# Patient Record
Sex: FEMALE | URBAN - METROPOLITAN AREA
[De-identification: names, ages, dates, MRNs, and addresses within clinical notes are randomized per-mention and may not be internally consistent; named-entity substitution may affect disease eponyms.]

---

## 2019-05-23 VITALS
HEIGHT: 65 IN | SYSTOLIC BLOOD PRESSURE: 110 MMHG | WEIGHT: 136.03 LBS | OXYGEN SATURATION: 99 % | DIASTOLIC BLOOD PRESSURE: 58 MMHG | TEMPERATURE: 98 F | HEART RATE: 61 BPM | RESPIRATION RATE: 16 BRPM

## 2019-05-23 NOTE — ASU PATIENT PROFILE, ADULT - PMH
Anemia    Endometriosis    Hypothyroidism    Insomnia    Postnasal drip Adenomyoma    Anemia    Endometriosis    Hypothyroidism    Insomnia    Postnasal drip

## 2019-05-24 ENCOUNTER — INPATIENT (INPATIENT)
Facility: HOSPITAL | Age: 34
LOS: 0 days | Discharge: HOME | DRG: 743 | End: 2019-05-25
Attending: OBSTETRICS & GYNECOLOGY | Admitting: OBSTETRICS & GYNECOLOGY
Payer: COMMERCIAL

## 2019-05-24 RX ORDER — KETOROLAC TROMETHAMINE 30 MG/ML
30 SYRINGE (ML) INJECTION EVERY 6 HOURS
Refills: 0 | Status: DISCONTINUED | OUTPATIENT
Start: 2019-05-24 | End: 2019-05-25

## 2019-05-24 RX ORDER — DOCUSATE SODIUM 100 MG
100 CAPSULE ORAL THREE TIMES A DAY
Refills: 0 | Status: DISCONTINUED | OUTPATIENT
Start: 2019-05-24 | End: 2019-05-25

## 2019-05-24 RX ORDER — ONDANSETRON 8 MG/1
4 TABLET, FILM COATED ORAL EVERY 6 HOURS
Refills: 0 | Status: DISCONTINUED | OUTPATIENT
Start: 2019-05-24 | End: 2019-05-25

## 2019-05-24 RX ORDER — ZOLPIDEM TARTRATE 10 MG/1
5 TABLET ORAL AT BEDTIME
Refills: 0 | Status: DISCONTINUED | OUTPATIENT
Start: 2019-05-24 | End: 2019-05-25

## 2019-05-24 RX ORDER — HYDROMORPHONE HYDROCHLORIDE 2 MG/ML
0.5 INJECTION INTRAMUSCULAR; INTRAVENOUS; SUBCUTANEOUS
Refills: 0 | Status: DISCONTINUED | OUTPATIENT
Start: 2019-05-24 | End: 2019-05-25

## 2019-05-24 RX ORDER — SIMETHICONE 80 MG/1
80 TABLET, CHEWABLE ORAL THREE TIMES A DAY
Refills: 0 | Status: DISCONTINUED | OUTPATIENT
Start: 2019-05-24 | End: 2019-05-25

## 2019-05-24 RX ORDER — SODIUM CHLORIDE 9 MG/ML
1000 INJECTION, SOLUTION INTRAVENOUS
Refills: 0 | Status: DISCONTINUED | OUTPATIENT
Start: 2019-05-24 | End: 2019-05-25

## 2019-05-24 RX ORDER — LIDOCAINE 4 G/100G
1 CREAM TOPICAL
Refills: 0 | Status: DISCONTINUED | OUTPATIENT
Start: 2019-05-24 | End: 2019-05-25

## 2019-05-24 RX ORDER — PHENAZOPYRIDINE HCL 100 MG
200 TABLET ORAL ONCE
Refills: 0 | Status: COMPLETED | OUTPATIENT
Start: 2019-05-24 | End: 2019-05-24

## 2019-05-24 RX ORDER — ACETAMINOPHEN 500 MG
650 TABLET ORAL EVERY 6 HOURS
Refills: 0 | Status: DISCONTINUED | OUTPATIENT
Start: 2019-05-24 | End: 2019-05-25

## 2019-05-24 RX ORDER — HYDROMORPHONE HYDROCHLORIDE 2 MG/ML
0.5 INJECTION INTRAMUSCULAR; INTRAVENOUS; SUBCUTANEOUS
Refills: 0 | Status: DISCONTINUED | OUTPATIENT
Start: 2019-05-24 | End: 2019-05-24

## 2019-05-24 RX ORDER — HYDROMORPHONE HYDROCHLORIDE 2 MG/ML
0.5 INJECTION INTRAMUSCULAR; INTRAVENOUS; SUBCUTANEOUS ONCE
Refills: 0 | Status: DISCONTINUED | OUTPATIENT
Start: 2019-05-24 | End: 2019-05-24

## 2019-05-24 RX ADMIN — HYDROMORPHONE HYDROCHLORIDE 0.5 MILLIGRAM(S): 2 INJECTION INTRAMUSCULAR; INTRAVENOUS; SUBCUTANEOUS at 10:41

## 2019-05-24 RX ADMIN — Medication 30 MILLIGRAM(S): at 22:30

## 2019-05-24 RX ADMIN — HYDROMORPHONE HYDROCHLORIDE 0.5 MILLIGRAM(S): 2 INJECTION INTRAMUSCULAR; INTRAVENOUS; SUBCUTANEOUS at 12:09

## 2019-05-24 RX ADMIN — Medication 100 MILLIGRAM(S): at 21:39

## 2019-05-24 RX ADMIN — Medication 30 MILLIGRAM(S): at 21:39

## 2019-05-24 RX ADMIN — SODIUM CHLORIDE 125 MILLILITER(S): 9 INJECTION, SOLUTION INTRAVENOUS at 10:42

## 2019-05-24 RX ADMIN — HYDROMORPHONE HYDROCHLORIDE 0.5 MILLIGRAM(S): 2 INJECTION INTRAMUSCULAR; INTRAVENOUS; SUBCUTANEOUS at 23:28

## 2019-05-24 RX ADMIN — SIMETHICONE 80 MILLIGRAM(S): 80 TABLET, CHEWABLE ORAL at 14:14

## 2019-05-24 RX ADMIN — HYDROMORPHONE HYDROCHLORIDE 0.5 MILLIGRAM(S): 2 INJECTION INTRAMUSCULAR; INTRAVENOUS; SUBCUTANEOUS at 13:45

## 2019-05-24 RX ADMIN — HYDROMORPHONE HYDROCHLORIDE 0.5 MILLIGRAM(S): 2 INJECTION INTRAMUSCULAR; INTRAVENOUS; SUBCUTANEOUS at 12:19

## 2019-05-24 RX ADMIN — HYDROMORPHONE HYDROCHLORIDE 0.5 MILLIGRAM(S): 2 INJECTION INTRAMUSCULAR; INTRAVENOUS; SUBCUTANEOUS at 13:23

## 2019-05-24 RX ADMIN — SODIUM CHLORIDE 125 MILLILITER(S): 9 INJECTION, SOLUTION INTRAVENOUS at 22:43

## 2019-05-24 RX ADMIN — HYDROMORPHONE HYDROCHLORIDE 0.5 MILLIGRAM(S): 2 INJECTION INTRAMUSCULAR; INTRAVENOUS; SUBCUTANEOUS at 10:58

## 2019-05-24 RX ADMIN — Medication 100 MILLIGRAM(S): at 14:14

## 2019-05-24 RX ADMIN — Medication 200 MILLIGRAM(S): at 14:53

## 2019-05-24 RX ADMIN — HYDROMORPHONE HYDROCHLORIDE 0.5 MILLIGRAM(S): 2 INJECTION INTRAMUSCULAR; INTRAVENOUS; SUBCUTANEOUS at 10:15

## 2019-05-24 RX ADMIN — Medication 650 MILLIGRAM(S): at 18:04

## 2019-05-24 RX ADMIN — HYDROMORPHONE HYDROCHLORIDE 0.5 MILLIGRAM(S): 2 INJECTION INTRAMUSCULAR; INTRAVENOUS; SUBCUTANEOUS at 10:29

## 2019-05-24 RX ADMIN — Medication 650 MILLIGRAM(S): at 19:00

## 2019-05-24 RX ADMIN — Medication 0.5 MILLIGRAM(S): at 11:11

## 2019-05-24 RX ADMIN — HYDROMORPHONE HYDROCHLORIDE 0.5 MILLIGRAM(S): 2 INJECTION INTRAMUSCULAR; INTRAVENOUS; SUBCUTANEOUS at 22:24

## 2019-05-24 RX ADMIN — Medication 30 MILLIGRAM(S): at 16:07

## 2019-05-24 RX ADMIN — SIMETHICONE 80 MILLIGRAM(S): 80 TABLET, CHEWABLE ORAL at 21:39

## 2019-05-24 RX ADMIN — Medication 30 MILLIGRAM(S): at 16:18

## 2019-05-24 NOTE — PROGRESS NOTE ADULT - SUBJECTIVE AND OBJECTIVE BOX
GYN POC   Patient seen at bedside.  Pain controlled, mild menstrual-like cramping. Tolerating sips.  Not OOB yet.  Vora in place - feels irritation at site of urethra.    Denies CP, palpitations, SOB, fever, chills, nausea, vomiting.    Vital Signs Last 24 Hrs  T(C): 37.3 (24 May 2019 17:02), Max: 37.3 (24 May 2019 17:02)  T(F): 99.1 (24 May 2019 17:02), Max: 99.1 (24 May 2019 17:02)  HR: 53 (24 May 2019 17:02) (46 - 62)  BP: 100/57 (24 May 2019 17:02) (93/53 - 113/65)  BP(mean): 71 (24 May 2019 16:00) (62 - 88)  RR: 17 (24 May 2019 17:02) (12 - 34)  SpO2: 97% (24 May 2019 17:02) (93% - 100%)    Physical Exam:  Gen: No Acute Distress  Pulm: Clear to auscultation bilaterally  GI: soft, nontender, mildly distended, decreased BS, no rebound, no guarding.  Dressings C/D/I with steristrips/tegaderm.  b/l ovarian suspensions  : Vora in place, functioning well  Ext: SCDs in place, wnl    I&O's Summary    24 May 2019 07:01  -  24 May 2019 18:00  --------------------------------------------------------  IN: 1125 mL / OUT: 385 mL / NET: 740 mL      MEDICATIONS  (STANDING):  acetaminophen   Tablet .. 650 milliGRAM(s) Oral every 6 hours  docusate sodium 100 milliGRAM(s) Oral three times a day  ketorolac   Injectable 30 milliGRAM(s) IV Push every 6 hours  lactated ringers. 1000 milliLiter(s) (125 mL/Hr) IV Continuous <Continuous>  lidocaine 2% Gel 1 Application(s) Topical two times a day  simethicone 80 milliGRAM(s) Chew three times a day    MEDICATIONS  (PRN):  HYDROmorphone  Injectable 0.5 milliGRAM(s) IV Push every 2 hours PRN Severe Pain (7 - 10)  ondansetron Injectable 4 milliGRAM(s) IV Push every 6 hours PRN Nausea and/or Vomiting    Allergies    No Known Allergies    Intolerances        LABS:                RADIOLOGY & ADDITIONAL TESTS:

## 2019-05-24 NOTE — PROGRESS NOTE ADULT - ASSESSMENT
33y Female POD# 0 s/p l/s endo excision  1. Neuro/Pain:  tylenol/toradol ATC  2  CV:   VS per routine  3. Pulm: Encourage ISS  4. GI: ADAT, zofran/reglan  5. :  Vora in place, TOV in AM after removal of suspensions.  Pyridium and topical lidocaine for urethral irritation  6. Heme: AM CBC  7. ID: --  8. DVT ppx: SCDs  9. Dispo: Likely POD#1

## 2019-05-24 NOTE — PROVIDER CONTACT NOTE (OTHER) - SITUATION
s/p lap excision of endometriosis, bilateral ovarian suspension, IU placement, villafuerte insertion

## 2019-05-24 NOTE — H&P ADULT - HISTORY OF PRESENT ILLNESS
33y G0  with Last Menstrual Period 4/27/19.  presenting for L/S endo. resection surgery.  Denies fever, chills, chest pain, palpitations, SOB, n/v.  +flatus, +BM  Pt reports dysmenorrhea and abdominal pain related to her endometriosis.    OB H/x:  None    GYN H/x:  Endometriosis    MED H/x:  Known hypothyroidism (well controlled).    SURG H/x:  None    Medications:  Synthroid 125 mcg.    Allergies:   NKDA     Vital Signs Last 24 Hrs  T(C): 36.1 (24 May 2019 09:35), Max: 36.6 (23 May 2019 13:23)  T(F): 97 (24 May 2019 09:35), Max: 97.9 (23 May 2019 13:23)  HR: 56 (24 May 2019 13:00) (46 - 62)  BP: 97/48 (24 May 2019 13:00) (93/53 - 113/65)  BP(mean): 62 (24 May 2019 13:00) (62 - 88)  RR: 12 (24 May 2019 13:00) (12 - 34)  SpO2: 98% (24 May 2019 13:00) (93% - 100%)    Physical Exam:  Gen: NAD, comfortable  GI: soft, nontender, nondistended + BS, no rebound no guarding  BME: normal anteverted uterus, no adnexal masses appreciated , No cervical motion tenderness   Spec: normal appearing cervix, no abnormal discharge.  Ext: no edema, erythema, tenderness .

## 2019-05-24 NOTE — PROVIDER CONTACT NOTE (OTHER) - ASSESSMENT
Pt had sudden onset of pain at urinary catheter insertion site. Pt states at lip of urinary opening. Catheter patent, draining small amount of clear, yellow urine. When pt bends right leg or does not move, pain is relieved. Pillow placed under right knee and leg part of bed elevated. Pt states some relief. GYN notified and aware and asked to assess pt.

## 2019-05-24 NOTE — H&P ADULT - NSHPREVIEWOFSYSTEMS_GEN_ALL_CORE
Constitutional: no malaise/fatigue/fevers  HEENT: no headache/sore throat/rhinorrhea  Respiratory: no cough/SOB/chest pain  Cardiac: no palpitations/chest pain  Vascular: no leg swelling  Gastrointestinal: no nausea/vomiting/diarrhea/constipation  Genitourinary: no dysuria/nocturia/polyuria  MSK: no joint or muscle pain  Skin: no rashes  Neuro: no headaches/focal weakness/numbness  Psych: no depression or anxiety

## 2019-05-25 VITALS
HEART RATE: 45 BPM | SYSTOLIC BLOOD PRESSURE: 94 MMHG | OXYGEN SATURATION: 100 % | RESPIRATION RATE: 16 BRPM | DIASTOLIC BLOOD PRESSURE: 52 MMHG

## 2019-05-25 LAB
ANION GAP SERPL CALC-SCNC: 8 MMOL/L — SIGNIFICANT CHANGE UP (ref 5–17)
BUN SERPL-MCNC: 9 MG/DL — SIGNIFICANT CHANGE UP (ref 7–23)
CALCIUM SERPL-MCNC: 8.6 MG/DL — SIGNIFICANT CHANGE UP (ref 8.4–10.5)
CHLORIDE SERPL-SCNC: 108 MMOL/L — SIGNIFICANT CHANGE UP (ref 96–108)
CO2 SERPL-SCNC: 25 MMOL/L — SIGNIFICANT CHANGE UP (ref 22–31)
CREAT SERPL-MCNC: 0.76 MG/DL — SIGNIFICANT CHANGE UP (ref 0.5–1.3)
GLUCOSE SERPL-MCNC: 94 MG/DL — SIGNIFICANT CHANGE UP (ref 70–99)
HCT VFR BLD CALC: 31.3 % — LOW (ref 34.5–45)
HGB BLD-MCNC: 10.2 G/DL — LOW (ref 11.5–15.5)
MCHC RBC-ENTMCNC: 30.1 PG — SIGNIFICANT CHANGE UP (ref 27–34)
MCHC RBC-ENTMCNC: 32.6 GM/DL — SIGNIFICANT CHANGE UP (ref 32–36)
MCV RBC AUTO: 92.3 FL — SIGNIFICANT CHANGE UP (ref 80–100)
NRBC # BLD: 0 /100 WBCS — SIGNIFICANT CHANGE UP (ref 0–0)
PLATELET # BLD AUTO: 157 K/UL — SIGNIFICANT CHANGE UP (ref 150–400)
POTASSIUM SERPL-MCNC: 3.8 MMOL/L — SIGNIFICANT CHANGE UP (ref 3.5–5.3)
POTASSIUM SERPL-SCNC: 3.8 MMOL/L — SIGNIFICANT CHANGE UP (ref 3.5–5.3)
RBC # BLD: 3.39 M/UL — LOW (ref 3.8–5.2)
RBC # FLD: 12.6 % — SIGNIFICANT CHANGE UP (ref 10.3–14.5)
SODIUM SERPL-SCNC: 141 MMOL/L — SIGNIFICANT CHANGE UP (ref 135–145)
WBC # BLD: 7.38 K/UL — SIGNIFICANT CHANGE UP (ref 3.8–10.5)
WBC # FLD AUTO: 7.38 K/UL — SIGNIFICANT CHANGE UP (ref 3.8–10.5)

## 2019-05-25 RX ORDER — ACETAMINOPHEN 500 MG
2 TABLET ORAL
Qty: 0 | Refills: 0 | DISCHARGE
Start: 2019-05-25

## 2019-05-25 RX ORDER — ZOLPIDEM TARTRATE 10 MG/1
1 TABLET ORAL
Qty: 1 | Refills: 0
Start: 2019-05-25

## 2019-05-25 RX ADMIN — Medication 650 MILLIGRAM(S): at 05:22

## 2019-05-25 RX ADMIN — Medication 650 MILLIGRAM(S): at 06:03

## 2019-05-25 RX ADMIN — Medication 100 MILLIGRAM(S): at 14:39

## 2019-05-25 RX ADMIN — ONDANSETRON 4 MILLIGRAM(S): 8 TABLET, FILM COATED ORAL at 10:06

## 2019-05-25 RX ADMIN — Medication 30 MILLIGRAM(S): at 10:06

## 2019-05-25 RX ADMIN — Medication 650 MILLIGRAM(S): at 00:19

## 2019-05-25 RX ADMIN — SIMETHICONE 80 MILLIGRAM(S): 80 TABLET, CHEWABLE ORAL at 05:22

## 2019-05-25 RX ADMIN — Medication 100 MILLIGRAM(S): at 05:22

## 2019-05-25 RX ADMIN — SODIUM CHLORIDE 125 MILLILITER(S): 9 INJECTION, SOLUTION INTRAVENOUS at 05:22

## 2019-05-25 RX ADMIN — Medication 30 MILLIGRAM(S): at 04:13

## 2019-05-25 RX ADMIN — Medication 650 MILLIGRAM(S): at 12:06

## 2019-05-25 RX ADMIN — Medication 30 MILLIGRAM(S): at 04:05

## 2019-05-25 RX ADMIN — SIMETHICONE 80 MILLIGRAM(S): 80 TABLET, CHEWABLE ORAL at 14:39

## 2019-05-25 RX ADMIN — Medication 650 MILLIGRAM(S): at 01:00

## 2019-05-25 NOTE — PROGRESS NOTE ADULT - SUBJECTIVE AND OBJECTIVE BOX
GYN PROGRESS NOTE    Patient evaluated at the bedside. No acute events. Patient reports that she had significant discomfort when her villafuerte was not draining properly but once it was adjusted, she became much more comfortable.   She denies CP/SOB/dizziness/nausea/vomiting/calf pain. Patient is looking forward to walking once her suspensions are removed.     O:   T(C): 37.1 (05-25-19 @ 05:22), Max: 37.4 (05-24-19 @ 23:53)  HR: 53 (05-25-19 @ 05:22) (46 - 62)  BP: 95/59 (05-25-19 @ 05:22) (93/53 - 113/65)  RR: 15 (05-25-19 @ 05:22) (12 - 34)  SpO2: 98% (05-25-19 @ 05:22) (93% - 100%)  Wt(kg): --    GEN: patient appears well, resting comfortably   LUNGS: no respiratory distress  ABD: soft, moderately distended, +BS, suspensions in place   EXT: no calf tenderness, SCDs in place         05-24 @ 07:01  -  05-25 @ 06:48  --------------------------------------------------------  IN: 2750 mL / OUT: 1810 mL / NET: 940 mL

## 2019-05-25 NOTE — DISCHARGE NOTE NURSING/CASE MANAGEMENT/SOCIAL WORK - NSDCDPATPORTLINK_GEN_ALL_CORE
You can access the [facility] Patient Portal, offered by [facility], by registering with the following website: [email or web address]

## 2019-05-25 NOTE — DISCHARGE NOTE PROVIDER - CARE PROVIDER_API CALL
Gordon Treviño)  Obstetrics and Gynecology  [address] [place] [address]  Phone: [phone]  Fax: [phone]  Follow Up Time:

## 2019-05-25 NOTE — PROGRESS NOTE ADULT - ATTENDING COMMENTS
pt seen at bedside  mild cramping due to IUD insertion. advised motrin 600 ATC q6 at home with tylenol 3 hr later  nondistended, soft  ovarian suspensions removed  for F/U in office in 10 days

## 2019-05-25 NOTE — DISCHARGE NOTE PROVIDER - HOSPITAL COURSE
Admitted s/p resection of endometriosis. Passing all postoperative milestones. Cleared for discharge.

## 2019-05-25 NOTE — DISCHARGE NOTE NURSING/CASE MANAGEMENT/SOCIAL WORK - NSDCPNINST_GEN_ALL_CORE
Pelvic Rest- no heavy lifting, no tampon use, no douching, no sexual intercourse until cleared by Dr. Treviño. Shower only, no bath tub and no swimming pool use until cleared by Dr. Treviño. Call Dr. Treviño if you have any questions or concerns. Educational material give- [employer] Patient Education: Surgical Wound Discharge Instructions.

## 2019-05-25 NOTE — DISCHARGE NOTE PROVIDER - NSDCCPGOAL_GEN_ALL_CORE_FT
Please see Dr. Treviño in 10 days. Call the office if you have severe worsening pain, heavy vaginal bleeding, or fevers greater than 100.4.

## 2019-05-25 NOTE — PROGRESS NOTE ADULT - ASSESSMENT
Assessment and Plan:  33y Female POD# 1 s/p l/s endo excision, hysteroscopy D&C and IUD placement. Patient clinically stable and meeting postoperative milestones appropriately.   1. Neuro/Pain:  tylenol/toradol ATC, Dilaudid PRN   2  CV:   VS per routine  3. Pulm: Encourage ISS  4. GI: Regular diet as tolerated; zofran/reglan PRN   5. :  Vora in place, TOV in AM after removal of suspensions - to be removed once cleared by attending.  Pyridium and topical lidocaine for urethral irritation  6. Heme: AM CBC pending   7. ID: --  8. DVT ppx: SCDs  9. Dispo: Likely POD#1

## 2019-05-28 PROCEDURE — 80048 BASIC METABOLIC PNL TOTAL CA: CPT

## 2019-05-28 PROCEDURE — 86901 BLOOD TYPING SEROLOGIC RH(D): CPT

## 2019-05-28 PROCEDURE — 86900 BLOOD TYPING SEROLOGIC ABO: CPT

## 2019-05-28 PROCEDURE — 86850 RBC ANTIBODY SCREEN: CPT

## 2019-05-28 PROCEDURE — 88305 TISSUE EXAM BY PATHOLOGIST: CPT

## 2019-05-28 PROCEDURE — 85027 COMPLETE CBC AUTOMATED: CPT

## 2019-05-28 PROCEDURE — 36415 COLL VENOUS BLD VENIPUNCTURE: CPT

## 2019-05-31 LAB — SURGICAL PATHOLOGY STUDY: SIGNIFICANT CHANGE UP

## 2019-06-04 DIAGNOSIS — E03.9 HYPOTHYROIDISM, UNSPECIFIED: ICD-10-CM

## 2019-06-04 DIAGNOSIS — D25.9 LEIOMYOMA OF UTERUS, UNSPECIFIED: ICD-10-CM

## 2019-06-04 DIAGNOSIS — N80.3 ENDOMETRIOSIS OF PELVIC PERITONEUM: ICD-10-CM

## 2021-04-07 NOTE — ASU PATIENT PROFILE, ADULT - NO SIGNIFICANT PAST SURGICAL HISTORY
warm/no numbness/no tingling/no discoloration <<----- Click to add NO significant Past Surgical History

## 2023-02-01 RX ORDER — LEVONORGESTREL 52 MG/1
1 INTRAUTERINE DEVICE INTRAUTERINE ONCE
COMMUNITY

## 2023-02-01 RX ORDER — LEVOTHYROXINE SODIUM 0.12 MG/1
125 TABLET ORAL
COMMUNITY